# Patient Record
Sex: MALE | Race: WHITE | NOT HISPANIC OR LATINO | Employment: FULL TIME | URBAN - METROPOLITAN AREA
[De-identification: names, ages, dates, MRNs, and addresses within clinical notes are randomized per-mention and may not be internally consistent; named-entity substitution may affect disease eponyms.]

---

## 2020-08-22 LAB
EXTERNAL CHLAMYDIA RESULT: NEGATIVE
EXTERNAL HIV CONFIRMATION: NORMAL
EXTERNAL HIV SCREEN: NORMAL
EXTERNAL SARS COV-2 ANTIBODY IGG: NEGATIVE
HBA1C MFR BLD HPLC: 5.1 %
HCV AB SER-ACNC: NEGATIVE
N GONORRHOEA RRNA SPEC QL PROBE: NEGATIVE

## 2021-05-03 ENCOUNTER — OFFICE VISIT (OUTPATIENT)
Dept: FAMILY MEDICINE CLINIC | Facility: CLINIC | Age: 21
End: 2021-05-03
Payer: COMMERCIAL

## 2021-05-03 VITALS
SYSTOLIC BLOOD PRESSURE: 142 MMHG | WEIGHT: 165 LBS | HEIGHT: 67 IN | BODY MASS INDEX: 25.9 KG/M2 | TEMPERATURE: 98.3 F | OXYGEN SATURATION: 98 % | HEART RATE: 67 BPM | RESPIRATION RATE: 16 BRPM | DIASTOLIC BLOOD PRESSURE: 80 MMHG

## 2021-05-03 DIAGNOSIS — U07.1 COVID-19: ICD-10-CM

## 2021-05-03 DIAGNOSIS — Z76.89 ESTABLISHING CARE WITH NEW DOCTOR, ENCOUNTER FOR: Primary | ICD-10-CM

## 2021-05-03 DIAGNOSIS — K21.9 GASTROESOPHAGEAL REFLUX DISEASE WITHOUT ESOPHAGITIS: ICD-10-CM

## 2021-05-03 PROCEDURE — 3008F BODY MASS INDEX DOCD: CPT | Performed by: FAMILY MEDICINE

## 2021-05-03 PROCEDURE — 1036F TOBACCO NON-USER: CPT | Performed by: FAMILY MEDICINE

## 2021-05-03 PROCEDURE — 99203 OFFICE O/P NEW LOW 30 MIN: CPT | Performed by: FAMILY MEDICINE

## 2021-05-03 PROCEDURE — 3725F SCREEN DEPRESSION PERFORMED: CPT | Performed by: FAMILY MEDICINE

## 2021-05-03 RX ORDER — ASPIRIN 325 MG
2 TABLET ORAL DAILY
COMMUNITY

## 2021-05-03 RX ORDER — PANTOPRAZOLE SODIUM 40 MG/1
40 TABLET, DELAYED RELEASE ORAL DAILY
COMMUNITY
Start: 2021-04-26

## 2021-05-03 NOTE — PATIENT INSTRUCTIONS
CONTINUE CURRENT TREATMENT PLAN  CONSIDER PECID OVER THE COUNTER AFTER COMPLETING PROTONIX    RV FOR CPX IN THE FUTURE, CALL SOON[facility] PRN

## 2021-05-03 NOTE — PROGRESS NOTES
BMI Counseling: Body mass index is 25 84 kg/m²  The BMI is above normal  Nutrition recommendations include encouraging healthy choices of fruits and vegetables and moderation in carbohydrate intake  Exercise recommendations include exercising 3-5 times per week  No pharmacotherapy was ordered  Assessment/Plan:    No problem-specific Assessment & Plan notes found for this encounter  Diagnoses and all orders for this visit:    Establishing care with new doctor, encounter for    Gastroesophageal reflux disease without esophagitis    COVID-19    Other orders  -     pantoprazole (PROTONIX) 40 mg tablet; Take 40 mg by mouth daily   -     Multiple Vitamins-Minerals (Multivitamin Gummies Adult) CHEW; Chew 2 each daily  -     HYDROXYZINE HCL PO; Take by mouth daily as needed          Patient Instructions   CONTINUE CURRENT TREATMENT PLAN  CONSIDER PECID OVER THE COUNTER AFTER COMPLETING [address]    RV FOR CPX IN THE FUTURE, CALL SOONER PRN        Return in about 6 months (around 11/3/2021) for Annual physical     Subjective:      Patient ID: Charles Mahmood is a 23 y o  male  Chief Complaint   Patient presents with    Establish Care    Fatigue     Covid + one month ago - still fatiged    Heartburn     Saw [facility] - Rx: Potonix & Sched EDG 5/14/21       PATIENT IS A NEW PATIENT TO THE PRACTICE    REVIEWED PAST MEDICAL HISTORY  DISCUSSED RECENT COVID INFECTION AND REFLUX ISSUES    FEELS MUCH BETTER  FATIGUE RESOLVING  GI COMPLAINTS IMPROVED    NO OTHER CONCERNS      The following portions of the patient's history were reviewed and updated as appropriate: allergies, current medications, past family history, past medical history, past social history, past surgical history and problem list     Review of Systems   Constitutional: Negative for chills, fatigue and fever  HENT: Negative for congestion, ear discharge, ear pain, mouth sores, postnasal drip, sore throat and trouble swallowing      Eyes: Negative for pain, discharge and visual disturbance  Respiratory: Negative for cough, shortness of breath and wheezing  Cardiovascular: Negative for chest pain, palpitations and leg swelling  Gastrointestinal: Negative for abdominal distention, abdominal pain, blood in stool, diarrhea and nausea  Endocrine: Negative for polydipsia, polyphagia and polyuria  Genitourinary: Negative for dysuria, frequency, hematuria and urgency  Musculoskeletal: Negative for arthralgias, gait problem and joint swelling  Skin: Negative for pallor and rash  Neurological: Negative for dizziness, syncope, speech difficulty, weakness, light-headedness, numbness and headaches  Hematological: Negative for adenopathy  Psychiatric/Behavioral: Negative for behavioral problems, confusion and sleep disturbance  The patient is not nervous/anxious  Current Outpatient Medications   Medication Sig Dispense Refill    HYDROXYZINE HCL PO Take by mouth daily as needed      Multiple Vitamins-Minerals (Multivitamin Gummies Adult) CHEW Chew 2 each daily      pantoprazole (PROTONIX) 40 mg tablet Take 40 mg by mouth daily        No current facility-administered medications for this visit  Objective:    /80   Pulse 67   Temp 98 3 °F (36 8 °C) (Temporal)   Resp 16   Ht 5' 7" (1 702 m)   Wt 74 8 kg (165 lb)   SpO2 98%   BMI 25 84 kg/m²        Physical Exam  Constitutional:       Appearance: He is well-developed  HENT:      Head: Normocephalic and atraumatic  Eyes:      General:         Right eye: No discharge  Left eye: No discharge  Conjunctiva/sclera: Conjunctivae normal       Pupils: Pupils are equal, round, and reactive to light  Neck:      Musculoskeletal: Neck supple  Thyroid: No thyromegaly  Vascular: No JVD  Cardiovascular:      Rate and Rhythm: Normal rate and regular rhythm  Heart sounds: Normal heart sounds  No murmur     Pulmonary:      Effort: Pulmonary effort is normal  Breath sounds: Normal breath sounds  No wheezing or rales  Abdominal:      General: Bowel sounds are normal       Palpations: Abdomen is soft  There is no mass  Tenderness: There is no abdominal tenderness  There is no guarding or rebound  Musculoskeletal: Normal range of motion  General: No tenderness or deformity  Lymphadenopathy:      Cervical: No cervical adenopathy  Skin:     General: Skin is warm and dry  Findings: No erythema or rash  Neurological:      Mental Status: He is alert and oriented to person, place, and time  Psychiatric:         Behavior: Behavior normal          Thought Content:  Thought content normal          Judgment: Judgment normal                 Jude Ortez MD

## 2022-10-31 ENCOUNTER — TELEPHONE (OUTPATIENT)
Dept: FAMILY MEDICINE CLINIC | Facility: CLINIC | Age: 22
End: 2022-10-31

## 2022-10-31 NOTE — TELEPHONE ENCOUNTER
Treasure Bermudez states he is no longer planning on being a patient of Dr Fernandes's  He plans on contacting his insurance co to get a list of other pcps  Please remove Dr Aaliyah Douglass as Corey's pcp  Thanks

## 2022-11-08 NOTE — TELEPHONE ENCOUNTER
11/08/22 5:50 PM     The office's request has been received, reviewed, and the patient chart updated  The PCP has successfully been removed with a patient attribution note  This message will now be completed      Thank you  Ricky Martinez